# Patient Record
Sex: FEMALE | Race: BLACK OR AFRICAN AMERICAN | NOT HISPANIC OR LATINO | Employment: UNEMPLOYED | URBAN - METROPOLITAN AREA
[De-identification: names, ages, dates, MRNs, and addresses within clinical notes are randomized per-mention and may not be internally consistent; named-entity substitution may affect disease eponyms.]

---

## 2024-07-20 ENCOUNTER — HOSPITAL ENCOUNTER (EMERGENCY)
Facility: HOSPITAL | Age: 29
Discharge: HOME | End: 2024-07-20
Attending: EMERGENCY MEDICINE
Payer: COMMERCIAL

## 2024-07-20 VITALS
RESPIRATION RATE: 18 BRPM | BODY MASS INDEX: 26.52 KG/M2 | SYSTOLIC BLOOD PRESSURE: 121 MMHG | WEIGHT: 175 LBS | HEIGHT: 68 IN | HEART RATE: 112 BPM | DIASTOLIC BLOOD PRESSURE: 77 MMHG | OXYGEN SATURATION: 98 % | TEMPERATURE: 98.7 F
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
## 2024-07-20 DIAGNOSIS — R51.9 ACUTE NONINTRACTABLE HEADACHE, UNSPECIFIED HEADACHE TYPE: Primary | ICD-10-CM

## 2024-07-20 DIAGNOSIS — S16.1XXA CERVICAL STRAIN, ACUTE, INITIAL ENCOUNTER: ICD-10-CM

## 2024-07-20 PROCEDURE — 99283 EMERGENCY DEPT VISIT LOW MDM: CPT

## 2024-07-20 RX ORDER — CYCLOBENZAPRINE HCL 10 MG
10 TABLET ORAL ONCE
Status: COMPLETED | OUTPATIENT
Start: 2024-07-20 | End: 2024-07-20

## 2024-07-20 RX ORDER — METHOCARBAMOL 500 MG/1
500 TABLET, FILM COATED ORAL 2 TIMES DAILY PRN
Qty: 10 TABLET | Refills: 0 | Status: SHIPPED | OUTPATIENT
Start: 2024-07-20 | End: 2024-07-26

## 2024-07-20 RX ORDER — NAPROXEN 500 MG/1
500 TABLET ORAL 2 TIMES DAILY PRN
Qty: 20 TABLET | Refills: 0 | Status: SHIPPED | OUTPATIENT
Start: 2024-07-20

## 2024-07-20 RX ORDER — IBUPROFEN 400 MG/1
400 TABLET ORAL ONCE
Status: COMPLETED | OUTPATIENT
Start: 2024-07-20 | End: 2024-07-20

## 2024-07-20 RX ADMIN — CYCLOBENZAPRINE 10 MG: 10 TABLET, FILM COATED ORAL at 19:55

## 2024-07-20 RX ADMIN — IBUPROFEN 400 MG: 400 TABLET, FILM COATED ORAL at 19:55
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
## 2024-07-20 NOTE — DISCHARGE INSTRUCTIONS
Please return to the emergency room for any worsening pain, fevers, weakness, dizziness, nausea, vision changes, difficulties breathing or any other concerns.

## 2025-03-24 ENCOUNTER — HOSPITAL ENCOUNTER (EMERGENCY)
Facility: HOSPITAL | Age: 30
Discharge: HOME | End: 2025-03-24
Attending: STUDENT IN AN ORGANIZED HEALTH CARE EDUCATION/TRAINING PROGRAM | Admitting: STUDENT IN AN ORGANIZED HEALTH CARE EDUCATION/TRAINING PROGRAM
Payer: COMMERCIAL

## 2025-03-24 VITALS
RESPIRATION RATE: 16 BRPM | OXYGEN SATURATION: 100 % | HEART RATE: 113 BPM | SYSTOLIC BLOOD PRESSURE: 121 MMHG | TEMPERATURE: 98.3 F | DIASTOLIC BLOOD PRESSURE: 79 MMHG

## 2025-03-24 DIAGNOSIS — K02.9 PAIN DUE TO DENTAL CARIES: ICD-10-CM

## 2025-03-24 DIAGNOSIS — K02.9 TOOTH CARIES: Primary | ICD-10-CM

## 2025-03-24 PROCEDURE — 99282 EMERGENCY DEPT VISIT SF MDM: CPT

## 2025-03-24 RX ORDER — PENICILLIN V POTASSIUM 500 MG/1
500 TABLET, FILM COATED ORAL 4 TIMES DAILY
Qty: 40 TABLET | Refills: 0 | Status: SHIPPED | OUTPATIENT
Start: 2025-03-24

## 2025-03-24 NOTE — ED NOTES
Pt c/o left sided dental pain that started approx 1month that became worse over the last couple days. Pt is preg with no pregnancy complaints

## 2025-03-24 NOTE — ED TRIAGE NOTES
Pt reports pain to left sided bottom tooth that started awhile ago but got worse over the weekend   No pertinent family history in first degree relatives

## 2025-03-24 NOTE — ED PROVIDER NOTES
EMERGENCY DEPARTMENT ENCOUNTER    Room Number:  S01/01  Date of encounter:  3/24/2025  PCP: Provider, No Known  Historian: Patient, family  Chronic or social conditions impacting care (social determinants of health): None  ER technician providing [facility] interpretation    HPI:  Chief Complaint: Dental pain  A complete HPI/ROS/PMH/PSH/SH/FH are unobtainable due to: None    Context: Laura Nava is a 29 y.o. female with a history of pregnancy, who presents to the ED c/o acute dental pain x 1 month.  Patient denies any fevers, chills.  Denies any swelling, shortness of breath or trouble swallowing.  Patient is 32 weeks pregnant.    Review of prior external notes (non-ED):   Reviewed prenatal office visit from 3/11/2025.  Patient being followed for routine medical care.    Review of prior external test results outside of this encounter:  I reviewed a CMP from 10/9/2024.  Creatinine 0.64, potassium 3.8    PAST MEDICAL HISTORY  Active Ambulatory Problems     Diagnosis Date Noted    No Active Ambulatory Problems     Resolved Ambulatory Problems     Diagnosis Date Noted    No Resolved Ambulatory Problems     No Additional Past Medical History         PAST SURGICAL HISTORY  No past surgical history on file.      FAMILY HISTORY  No family history on file.      SOCIAL HISTORY  Social History     Socioeconomic History    Marital status:    Tobacco Use    Smoking status: Unknown   Substance and Sexual Activity    Alcohol use: Defer    Drug use: Defer         ALLERGIES  Patient has no known allergies.        REVIEW OF SYSTEMS  All systems reviewed and negative except for those discussed in HPI.       PHYSICAL EXAM    I have reviewed the triage vital signs and nursing notes.    ED Triage Vitals   Temp Heart Rate Resp BP SpO2   03/24/25 1007 03/24/25 1007 03/24/25 1007 03/24/25 1012 03/24/25 1007   98.3 °F (36.8 °C) 113 16 121/79 100 %      Temp src Heart Rate Source Patient Position BP Location FiO2 (%)   -- -- -- --  --              Physical Exam  GENERAL: Alert, oriented, not distressed  HENT: Cavity/fractured tooth at tooth #17.  No surrounding erythema or swelling.  Submandibular tissues are soft.  EYES: no scleral icterus, EOMI  CV: regular rhythm, regular rate, no murmur  RESPIRATORY: normal effort, CTA  ABDOMEN: soft, gravid  MUSCULOSKELETAL: no deformity, FROM, no calf swelling or tenderness  NEURO: alert, moves all extremities, follows commands  SKIN: warm, dry    MEDICATIONS GIVEN IN ER    Medications - No data to display      ADDITIONAL ORDERS CONSIDERED BUT NOT ORDERED:  Admission was considered but after careful review of the patient's presentation, physical examination, diagnostic results, and response to treatment the patient may be safely discharged with outpatient follow-up.       PROGRESS, DATA ANALYSIS, CONSULTS, AND MEDICAL DECISION MAKING    All labs have been independently interpreted by myself.  All radiology studies have been independently interpreted by myself and discussed with radiologist dictating the report.   EKGs independently interpreted by myself.  Discussion below represents my analysis of pertinent findings related to patient's condition, differential diagnosis, treatment plan and final disposition.    I have discussed case with Dr. Isaacs, emergency room physician.  He has performed his own bedside examination and agrees with treatment plan.    ED Course as of 03/24/25 1859   Mon Mar 24, 2025   1015 Patient presents with a 1 month history of dental pain.  On exam patient has an obvious fractured tooth/cavity at tooth #17.  No significant swelling or submandibular erythema.  No evidence of Osamni's angina.  Plan for antibiotics and referral to dentist. [EE]      ED Course User Index  [EE] Seamus Yin PA       AS OF 18:59 EDT VITALS:    BP - 121/79  HR - 113  TEMP - 98.3 °F (36.8 °C)  O2 SATS - 100%        DIAGNOSIS  Final diagnoses:   Tooth caries   Pain due to dental caries          DISPOSITION  Discharged    Admission was considered but after careful review of the patient's presentation, physical examination, diagnostic results, and response to treatment the patient may be safely discharged with outpatient follow-up.         Dictated utilizing Dragon dictation     Seamus Yin PA  03/24/25 1869

## 2025-03-24 NOTE — DISCHARGE INSTRUCTIONS
[facility] List    [facility]  [address]  [phone] Based on Income (Monday-Friday)  Appointment ONLY 8am-1pm  $15 minimum   [facility]  [address]  [phone] Based on Income (Monday-Friday)  [facility]  $12-24 minimum   [facility]  [address]   [phone] By appointment ONLY  Must call by 8:30 am to obtain an appointment for the next day  $50 minimum   [facility]  [address]  [phone] Must be homeless to be seen   [facility]  [address]  [phone] Walk in and appointments  7 days a week 9am-9pm  $83 minimum   [facility]  [address] [place]  [phone] Walk in and appointments  9am-4pm  $50 minimum  Passport and other insurances accepted   [facility]  [address]  [phone] Walk in and appointments  9am-4pm  $50 minimum  Passport and other insurances accepted   [address] [place] [address] Dental  [address]  [phone] Walk in and appointments  9am-4pm  $50 minimum  Passport and other insurances accepted   [facility]  [phone] Walk in and appointments  9am-4pm  $50 minimum  Passport and other insurances accepted   [address]  [phone] Walk in and appointments  9am-4pm  $50 minimum  Passport and other insurances accepted       List listing is provided only as an informal guide and is not guaranteed to be complete or correct.  Please may your own inquiries and confirm the terms of care prior to setting an appointment.